# Patient Record
Sex: MALE | URBAN - METROPOLITAN AREA
[De-identification: names, ages, dates, MRNs, and addresses within clinical notes are randomized per-mention and may not be internally consistent; named-entity substitution may affect disease eponyms.]

---

## 2024-11-20 ENCOUNTER — OFFICE VISIT (OUTPATIENT)
Dept: URBAN - METROPOLITAN AREA CLINIC 48 | Facility: CLINIC | Age: 49
End: 2024-11-20

## 2024-11-20 NOTE — PHYSICAL EXAM CARDIOVASCULAR:
CHIEF COMPLAINT(S): No chief complaint on file.      HISTORY OF PRESENT ILLNESS:  Oscar Pedro is a 53 year old {ssk right/left handed:[id number]} male who presents for a follow-up visit for his right  Shoulder rotator cuff tendonitis that been presents since 9/30/2019.  He was last seen in office 11/12/2019 where he was instructed to ice 2-3 times a day, complete his Home Exercise Program, and begin Physical Therapy.  Accompanied by ***  Location:right shoulder  Date of Onset: 9/30/2019  Severity:{#:[id number]}/10   Patient has been *** compliant with recommended treatment at previous visit.   Patient feels at ***/100.  Patient feels ***% improved since last office visit.   ***    Roomed By: ***    MEDICATIONS:  Current Outpatient Medications   Medication Sig Dispense Refill   • naproxen (NAPROSYN) 500 MG tablet TK 1 T PO BID  0     No current facility-administered medications for this visit.        ALLERGIES:   ALLERGIES:  No Known Allergies    VITAL SIGNS:  There were no vitals taken for this visit.  There is no height or weight on file to calculate BMI.    EXAM:  This is a 53 year old male, awake, alert, and cooperative. He is well nourished, well developed, and in no apparent distress.  Pulmonary - Respiratory rate within normal limits. No increased work of breathing.  Skin:  Head, neck, and extremity skin is intact without rashes or lesions.     Shoulder Exam:  Comprehensive Orthopaedic Shoulder Exam     Right Range of Motion: Active  Forward Flexion: 170  Abduction: 170  External Rotation: 50  Internal Rotation: to L5 spine     Left Range of Motion: Active  Forward Flexion: 170  Abduction: 170  External Rotation: 50  Internal Rotation: to L5 spine     Right Strength Exam  Deltoid strength is 5/5 without pain.  RC - SS strength is 5/5 with pain.  RC - ER strength is 5/5 with pain.  RC - IR strength is 5/5 with pain.     Left Strength Exam  Deltoid strength is 5/5 without pain.  RC - SS strength is 5/5 without  no edema,  no murmurs,  regular rate and rhythm pain.  RC - ER strength is 5/5 without pain.  RC - IR strength is 5/5 without pain.     Right shoulder:  Moderate SS/GT tenderness noted.  Neer's sign is positive  Hawkin's sign is positive.  Mild painful arc / range appreciated.  Drop-arm is negative.  Lift-off sign / belly press is positive.  Mild bicipital tenderness noted.  [place]ips[place]'s sign is negative.  Yergason's test is negative.  Speed's test is negative.  Negative for muscle deformity (Fabian).  No AC tenderness noted.  Cross-chest adduction is negative.  Negative for AC deformity.     Left shoulder:  No SS/GT tenderness noted.  Neer's sign is negative  Hawkin's sign is negative.  No painful arc / range appreciated.  Drop-arm is negative.  Superior escape is negative.  Lift-off sign / belly press is negative.  No bicipital tenderness noted.  Philips[place]'s sign is negative.  Yergason's test is negative.  Speed's test is negative.  Negative for muscle deformity (Fabian).  No AC tenderness noted.  Cross-chest adduction is negative.  Negative for AC deformity.     Right Additional Exam  No atrophy / asymmetry appreciated.  Scapular winging is negative.  Mild subacromial crepitation noted.  No scapulothoracic crepitation noted.  Mild medial scapular tenderness.  Mild trapezius pain.     Left Additional Exam  No atrophy / asymmetry appreciated.  Scapular winging is negative.  No subacromial crepitation noted.  No scapulothoracic crepitation noted.  No medial scapular tenderness.  No trapezius pain.     Right Vascular Exam  Radial pulse is 2+.  No edema noted.     Left Vascular Exam  Radial pulse is 2+.  No edema noted.  Lymphatics - There is no evidence of generalized adenopathy.    IMAGING &TEST:  Follow-up imaging studies (x-rays, MRI) were reviewed. These show:  ***    ASSESSMENT & PLAN:  Oscar Pedro is a 53 year old male ***  .   Plan as follows:  1. ***  2. ***  3. ***  4. ***     Restrictions: ***    The patient will follow up with us in {NUMBERS 1-31:680208}  {days wks mos yr:163597}     Based on the condition of this patient a total of {Minutes:654805} minutes were spent in direct face-to-face contact with the patient.  More than 50% of this was spent consulting and coordinating care.    Electronically Signed by:    Júnior Blanc DO 12/05/19

## 2024-12-18 ENCOUNTER — OFFICE VISIT (OUTPATIENT)
Dept: URBAN - METROPOLITAN AREA CLINIC 48 | Facility: CLINIC | Age: 49
End: 2024-12-18